# Patient Record
(demographics unavailable — no encounter records)

---

## 2024-12-31 NOTE — DISCUSSION/SUMMARY
[de-identified] : given his trauma history without pain resolution for several months following nsaids, at home exercises, activity modification, we are ording a left knee non contrast mri to evaluate for a chondral defect to the medial femoral condyle  fu after mri  ----------------------------------------------- Home Exercise The patient is instructed on a home exercise program.  Activity Modification The patient was advised to modify their activities.  Dx / Natural History The patient was advised of the diagnosis.  The natural history of the pathology was explained in full to the patient in layman's terms.  Several different treatment options were discussed and explained in full to the patient including the risks and benefits of both surgical and non-surgical treatments.  All questions and concerns were answered.  Pain Guide Activities The patient was advised to let pain guide the gradual advancement of activities.  RICE I explained to the patient that rest, ice, compression, and elevation would benefit them.  They may return to activity after follow-up or when they no longer have any pain.  The patient's current medication management of their orthopedic diagnosis was reviewed today: (1) We discussed a comprehensive treatment plan that included possible pharmaceutical management involving the use of prescription strength medications including but not limited to options such as oral Naprosyn 500mg BID, once daily Meloxicam 15 mg, or 500-650 mg Tylenol versus over the counter oral medications and topical prescription NSAID Pennsaid vs over the counter Voltaren gel. (2) There is a moderate risk of morbidity with further treatment, especially from use of prescription strength medications and possible side effects of these medications which include upset stomach with oral medications, skin reactions to topical medications and cardiac/renal issues with long term use. (3) I recommended that the patient follow-up with their medical physician to discuss any significant specific potential issues with long term medication use such as interactions with current medications or with exacerbation of underlying medical comorbidities. (4) The benefits and risks associated with use of injectable, oral or topical, prescription and over the counter anti-inflammatory medications were discussed with the patient. The patient voiced understanding of the risks including but not limited to bleeding, stroke, kidney dysfunction, heart disease, and were referred to the black box warning label for further information.

## 2024-12-31 NOTE — DISCUSSION/SUMMARY
[de-identified] : given his trauma history without pain resolution for several months following nsaids, at home exercises, activity modification, we are ording a left knee non contrast mri to evaluate for a chondral defect to the medial femoral condyle  fu after mri  ----------------------------------------------- Home Exercise The patient is instructed on a home exercise program.  Activity Modification The patient was advised to modify their activities.  Dx / Natural History The patient was advised of the diagnosis.  The natural history of the pathology was explained in full to the patient in layman's terms.  Several different treatment options were discussed and explained in full to the patient including the risks and benefits of both surgical and non-surgical treatments.  All questions and concerns were answered.  Pain Guide Activities The patient was advised to let pain guide the gradual advancement of activities.  RICE I explained to the patient that rest, ice, compression, and elevation would benefit them.  They may return to activity after follow-up or when they no longer have any pain.  The patient's current medication management of their orthopedic diagnosis was reviewed today: (1) We discussed a comprehensive treatment plan that included possible pharmaceutical management involving the use of prescription strength medications including but not limited to options such as oral Naprosyn 500mg BID, once daily Meloxicam 15 mg, or 500-650 mg Tylenol versus over the counter oral medications and topical prescription NSAID Pennsaid vs over the counter Voltaren gel. (2) There is a moderate risk of morbidity with further treatment, especially from use of prescription strength medications and possible side effects of these medications which include upset stomach with oral medications, skin reactions to topical medications and cardiac/renal issues with long term use. (3) I recommended that the patient follow-up with their medical physician to discuss any significant specific potential issues with long term medication use such as interactions with current medications or with exacerbation of underlying medical comorbidities. (4) The benefits and risks associated with use of injectable, oral or topical, prescription and over the counter anti-inflammatory medications were discussed with the patient. The patient voiced understanding of the risks including but not limited to bleeding, stroke, kidney dysfunction, heart disease, and were referred to the black box warning label for further information.

## 2024-12-31 NOTE — PHYSICAL EXAM
[de-identified] : left knee max TTP central Medial femoral condyle mild medial joint line ttp ligamentously stable nvi full motion

## 2024-12-31 NOTE — HISTORY OF PRESENT ILLNESS
[de-identified] : The patient is a 46 year old right hand dominant male who presents today complaining of left knee pain Date of Injury/Onset: 1 year ago Pain:    At Rest: 1/10  With Activity:  4-5/10  Mechanism of injury: jumping over a puddle This is not a Work Related Injury being treated under Worker's Compensation. This is not an athletic injury occurring associated with an interscholastic or organized sports team. Quality of symptoms: unstable, medial knee pain Improves with: rest Worse with: walking Prior treatment: no Prior Imaging: no

## 2024-12-31 NOTE — PHYSICAL EXAM
[de-identified] : left knee max TTP central Medial femoral condyle mild medial joint line ttp ligamentously stable nvi full motion

## 2024-12-31 NOTE — HISTORY OF PRESENT ILLNESS
[de-identified] : The patient is a 46 year old right hand dominant male who presents today complaining of left knee pain Date of Injury/Onset: 1 year ago Pain:    At Rest: 1/10  With Activity:  4-5/10  Mechanism of injury: jumping over a puddle This is not a Work Related Injury being treated under Worker's Compensation. This is not an athletic injury occurring associated with an interscholastic or organized sports team. Quality of symptoms: unstable, medial knee pain Improves with: rest Worse with: walking Prior treatment: no Prior Imaging: no

## 2025-01-17 NOTE — HEALTH RISK ASSESSMENT
[Patient reported colonoscopy was normal] : Patient reported colonoscopy was normal [ColonoscopyDate] : 04/24 [ColonoscopyComments] : Repeat in 7 to 10 years

## 2025-01-17 NOTE — PHYSICAL EXAM
[No Acute Distress] : no acute distress [Well Nourished] : well nourished [Well Developed] : well developed [Well-Appearing] : well-appearing [PERRL] : pupils equal round and reactive to light [EOMI] : extraocular movements intact [Normal Outer Ear/Nose] : the outer ears and nose were normal in appearance [No JVD] : no jugular venous distention [No Respiratory Distress] : no respiratory distress  [No Accessory Muscle Use] : no accessory muscle use [Clear to Auscultation] : lungs were clear to auscultation bilaterally [Normal Rate] : normal rate  [Regular Rhythm] : with a regular rhythm [Normal S1, S2] : normal S1 and S2 [No Carotid Bruits] : no carotid bruits [No Edema] : there was no peripheral edema [Soft] : abdomen soft [Non Tender] : non-tender [Non-distended] : non-distended [No Masses] : no abdominal mass palpated [No HSM] : no HSM [Normal Bowel Sounds] : normal bowel sounds [Normal Posterior Cervical Nodes] : no posterior cervical lymphadenopathy [Normal Anterior Cervical Nodes] : no anterior cervical lymphadenopathy [No CVA Tenderness] : no CVA  tenderness [Grossly Normal Strength/Tone] : grossly normal strength/tone [No Rash] : no rash [Coordination Grossly Intact] : coordination grossly intact [No Focal Deficits] : no focal deficits [Normal Gait] : normal gait [Normal Affect] : the affect was normal [Normal Mood] : the mood was normal [Normal Insight/Judgement] : insight and judgment were intact

## 2025-01-17 NOTE — HISTORY OF PRESENT ILLNESS
[FreeTextEntry1] :  BREANNA is a 46-year-old male here for a CPE [de-identified] : CPE  as above +ve FAST no CP/SOB c activity no dizziness occasional STABLE RARE palpitations   no syncope no N/V +ve loose stools c 1- BM's /day 2n2 GLP-1 therapy  no bloody/black stools  no urinary complaints   pt reports a nystagmus like feeling when heavily focused certain tasks

## 2025-01-17 NOTE — PLAN
[FreeTextEntry1] : EKG performed: NSR normal axis, no ST/T changes (see scanned tracing)   see lab orders   see radiology orders   see referral x 2

## 2025-02-10 NOTE — HISTORY OF PRESENT ILLNESS
[FreeTextEntry1] : 46 year old male with PMHx: GERD, obesity BMI >30, cardiac murmur who presents for cardiovascular evaluation.   orthopaedic spinal surgeon history of APC's, VPC's as young adult no cardiac history other than this very active, able to complete all ADL's and advanced activities family hx of hyperlipidemia actively trying to lose weight denies tobacco use any kind, no illicit/recreational drug use social alcohol use by PCP told he has a murmur and presents for this. No symptoms Denies chest pain/pressure, palpitations, irregular and/or rapid heart beat, SOB, JOHNSON, syncope/near syncope, dizziness, orthopnea, PND, cough, edema, f/c, n/v/d, hematuria, or hematochezia. Denies claudication, PAD, venous changes, Erectile dysfunction, TIA/CVA history, dizziness, amaroux fugax, vision/sensory changes.

## 2025-02-10 NOTE — PHYSICAL EXAM
[Well Developed] : well developed [Well Nourished] : well nourished [No Acute Distress] : no acute distress [Obese] : obese [Normal Conjunctiva] : normal conjunctiva [Normal Venous Pressure] : normal venous pressure [No Carotid Bruit] : no carotid bruit [Normal S1, S2] : normal S1, S2 [No Murmur] : no murmur [No Rub] : no rub [No Gallop] : no gallop [Clear Lung Fields] : clear lung fields [Good Air Entry] : good air entry [No Respiratory Distress] : no respiratory distress  [Normal Bowel Sounds] : normal bowel sounds [Normal Gait] : normal gait [No Edema] : no edema [No Cyanosis] : no cyanosis [No Clubbing] : no clubbing [No Varicosities] : no varicosities [Normal Radial B/L] : normal radial B/L [Normal PT B/L] : normal PT B/L [No Rash] : no rash [No Skin Lesions] : no skin lesions [Moves all extremities] : moves all extremities [No Focal Deficits] : no focal deficits [Normal Speech] : normal speech [Alert and Oriented] : alert and oriented [Normal memory] : normal memory

## 2025-02-10 NOTE — ASSESSMENT
[FreeTextEntry1] : 46 year old male with PMHx: GERD, obesity BMI >30, cardiac murmur who presents for cardiovascular evaluation.

## 2025-02-10 NOTE — DISCUSSION/SUMMARY
[FreeTextEntry1] : Patient presents for: +evaluation of cardiac murmur obesity BMI >30 hx of APC's/VPC's as young adult     Orders placed including imaging/meds:  - 2d TTE to evaluate cardiac murmur   Recommend for general cardiovascular health consider Exercise treadmill stress test and CAC score.     Patient warm and euvolemic. There is no evidence of active ACS, decompensated HF, uncontrolled arrhythmias or significant valvular heart disease at present. EKG is non-ischemic. We went over the EKG in office today, all questions answered. Vitals reviewed. Cardiac testing recent reviewed.   I've asked the patient to keep a blood pressure journal and report back if SBP >130 or DBP >90 on 2-3 separate random occasions. The patient is aware of alarm cardiac type symptoms, will call with questions or concerns and is aware to activate 911 and/or present to the closest emergency department if concerns.   Follow up: with me in  12 months or sooner as requested.     I strongly encouraged the patient to pursue the following preventative cardiology, lifestyle modifications which are necessary for long-term cardiovascular health. The following is recommended: Advised a mediterranean and/or plant predominant, high fiber, limited salt (ideal <2 g/day), low fat diet, stress reduction/psychosomatic management, sleep hygiene/RICHARD testing and healthy weight loss, annual influenza/preventive vaccines, medication + treatment adherence/compliance, high risk behavior mitigation (I.e. tobacco abstinence, alcohol abstinence, no binge drinking, recreational/illicit drug use abstinence), increased exercise activity aiming for 150 minutes per week of moderate physical activity as per AHA/ACC standard for long term cardiovascular risk reduction. [EKG obtained to assist in diagnosis and management of assessed problem(s)] : EKG obtained to assist in diagnosis and management of assessed problem(s)

## 2025-04-21 NOTE — HISTORY OF PRESENT ILLNESS
[FreeTextEntry1] : Patient with a history of depression APCs, cardiac murmur, obstructive sleep apnea on CPAP, hemochromatosis   Patient is here for GERD.  Mostly gets heartburn few times a week.  Symptoms are controlled with Nexium 20 mg a day.  Gets rare regurgitation, rare dysphagia.  Symptoms have been for 10 to 15 years.  Sometimes patient tries to go without the Nexium for 2 or 3 days.  By day #4-5 heartburn reoccurs.  He has no unintentional weight loss.  He has never had an upper endoscopy.  Patient had a routine screening colonoscopy which was normal in 2024 by Dr. Natalia Sabillon  In January 2025 ALT 73 hemoglobin 15.6.  He has known history of hemochromatosis.  Last phlebotomy was 5 years ago.  He is not following with hematology.  In January iron was 172, iron binding capacity 241 (normal), unsaturated iron binding capacity 69 (low), iron saturation is 71 which is high

## 2025-04-21 NOTE — ASSESSMENT
[FreeTextEntry1] : A/P Patient with GERD-has never had an endoscopy.  Will do endoscopy to rule out Rich's EGD to be done at [facility].  Patient has history of sleep apnea  I discussed the risks and benefits of EGD and patient was given opportunity to ask questions. EGD to r/o Rich's  esophagus, PUD, mass, AVM'S. Pt is medically optimized for EGD  Patient on Zepbound.  He was told to hold this for 1 week  Today's instructions for acid reflux include avoid provocative foods. For example citrus alcohol coffee chocolate mints. Smaller meals, no eating 3 hours prior to bedtime and elevate head of the bed prior to sleep.  For now we will continue Nexium 20 mg a day.  However may consider transitioning to Pepcid 40 mg a day after EGD is complete.  Patient with history of hemochromatosis-hematology referral was given.  May require phlebotomy.  High iron saturation  Screening colonoscopy due in 2034

## 2025-04-24 NOTE — ASSESSMENT
[FreeTextEntry1] : Patient is a 46 M PMHx Hereditary Hemochromatosis, RICHARD, GERD referred for hemochromatosis.   Plan:  - Sending CBC, CMP, iron studies, Hemochromatosis mutation - Last ferritin ~2000. Will schedule for phlebotomies every 2 weeks, goal ferritin . 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

## 2025-04-24 NOTE — HISTORY OF PRESENT ILLNESS
[de-identified] : Patient is a 46 M PMHx Hereditary Hemochromatosis, RICHARD, GERD referred for hemochromatosis.   Patient was diagnosed with Hemochromatosis several years, is a compound heterozygote. Last phlebotomy was 5 years ago. No FHx hemochromatosis, liver disease, sudden cardiac death. Not on any iron supplementation, no previous blood transfusions. Denies jaundice, abdominal pain, abdominal distension, CP, SOB.

## 2025-06-26 NOTE — ASSESSMENT
[FreeTextEntry1] : A/P gerd  I discussed the risks and benefits of EGD and patient was given opportunity to ask questions. EGD to r/o Rich's  esophagus, PUD, mass, AVM'S. Pt is medically optimized for EGD

## 2025-07-29 NOTE — ASSESSMENT
[FreeTextEntry1] : Patient is a 46 M PMHx Hereditary Hemochromatosis, RICHARD, GERD presents for follow up of hemochromatosis.  Plan: - Sending CBC, CMP, iron studies, Ferritin  - Last ferritin 1617. Will schedule for phlebotomies every 2 weeks, goal ferritin . 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

## 2025-07-29 NOTE — HISTORY OF PRESENT ILLNESS
[de-identified] : Patient is a 46 M PMHx Hereditary Hemochromatosis, RICHARD, GERD presents for follow up of hemochromatosis.  Patient was diagnosed with Hemochromatosis several years ago, is a compound heterozygote. Last phlebotomy was 5 years ago. No FHx hemochromatosis, liver disease, sudden cardiac death. Not on any iron supplementation, no previous blood transfusions. Denies jaundice, abdominal pain, abdominal distension, CP, SOB.  Interval history 7/30/25: Last ferritin on 4/24/25 was 1617 down from 2506 in Jan 2025. Normal Iron stores in April 2025. MRI liver?  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 Last colonoscopy 4/26/24-WNL, due again in 7-10 years  Had CT angio chest for eval of dilated aortic root on echo on 4/24/25-aortic root 4.1cm, ascending aorta 3.5cm. Hepatic steatosis noted.